# Patient Record
Sex: FEMALE | Race: WHITE | NOT HISPANIC OR LATINO | URBAN - NONMETROPOLITAN AREA
[De-identification: names, ages, dates, MRNs, and addresses within clinical notes are randomized per-mention and may not be internally consistent; named-entity substitution may affect disease eponyms.]

---

## 2017-01-18 ENCOUNTER — OFFICE VISIT (OUTPATIENT)
Dept: NEUROLOGY | Facility: CLINIC | Age: 62
End: 2017-01-18

## 2017-01-18 VITALS
BODY MASS INDEX: 31.89 KG/M2 | HEART RATE: 69 BPM | DIASTOLIC BLOOD PRESSURE: 82 MMHG | HEIGHT: 63 IN | WEIGHT: 180 LBS | OXYGEN SATURATION: 98 % | SYSTOLIC BLOOD PRESSURE: 154 MMHG

## 2017-01-18 DIAGNOSIS — R41.0 CONFUSION AND DISORIENTATION: ICD-10-CM

## 2017-01-18 DIAGNOSIS — E72.20 HYPERAMMONEMIA (HCC): Primary | ICD-10-CM

## 2017-01-18 DIAGNOSIS — R18.8 CIRRHOSIS OF LIVER WITH ASCITES, UNSPECIFIED HEPATIC CIRRHOSIS TYPE (HCC): ICD-10-CM

## 2017-01-18 DIAGNOSIS — K74.60 CIRRHOSIS OF LIVER WITH ASCITES, UNSPECIFIED HEPATIC CIRRHOSIS TYPE (HCC): ICD-10-CM

## 2017-01-18 DIAGNOSIS — R41.3 MEMORY LOSS: ICD-10-CM

## 2017-01-18 PROCEDURE — 99243 OFF/OP CNSLTJ NEW/EST LOW 30: CPT | Performed by: PSYCHIATRY & NEUROLOGY

## 2017-01-18 RX ORDER — GLIMEPIRIDE 2 MG/1
2 TABLET ORAL
COMMUNITY

## 2017-01-18 RX ORDER — BUSPIRONE HYDROCHLORIDE 5 MG/1
5 TABLET ORAL 3 TIMES DAILY
COMMUNITY

## 2017-01-18 RX ORDER — ASPIRIN 81 MG/1
81 TABLET ORAL DAILY
COMMUNITY

## 2017-01-18 RX ORDER — LISINOPRIL 10 MG/1
10 TABLET ORAL DAILY
COMMUNITY

## 2017-01-18 RX ORDER — LACTULOSE 10 G/15ML
20 SOLUTION ORAL 2 TIMES DAILY PRN
COMMUNITY

## 2017-01-18 RX ORDER — MELATONIN
1000 DAILY
COMMUNITY

## 2017-01-18 RX ORDER — NADOLOL 20 MG/1
20 TABLET ORAL DAILY
COMMUNITY

## 2017-01-18 RX ORDER — CHLORAL HYDRATE 500 MG
CAPSULE ORAL
COMMUNITY

## 2017-01-18 NOTE — PROGRESS NOTES
Subjective      Paty Harper is a 61 y.o. female.     History of Present Illness   Pt is a 62 yo F c/o trouble with memory. She has a history significant for cirrhosis secondary to NAFLD with hyperammonemia. Pt states her ammonia level last week was in the 100's. She is taking lactulose and cirrhosis is managed at . Her memory issues have been occurring for the past few years, and has been fairly stable during this time. She tends to forget how to work things, such as the answering machine. She has trouble remembering telephone numbers or keeping a cooking recipe in her memory. She reports her mind feels sluggish and slow at times. Pt denies any tremor or gait instability. Also denies any history of alcohol use.    The following portions of the patient's history were reviewed and updated as appropriate: allergies, current medications, past family history, past medical history, past social history, past surgical history and problem list.    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    Review of Systems   Constitutional: Negative for chills and fever.   HENT: Negative for congestion, hearing loss and rhinorrhea.    Eyes: Negative for pain, discharge and redness.   Respiratory: Negative for cough and shortness of breath.    Cardiovascular: Negative for chest pain and palpitations.   Gastrointestinal: Negative for abdominal pain and nausea.   Endocrine: Negative for cold intolerance and heat intolerance.   Genitourinary: Negative for dysuria, flank pain and frequency.   Musculoskeletal: Negative for joint swelling, myalgias, neck pain and neck stiffness.   Skin: Negative for pallor  "and rash.   Allergic/Immunologic: Negative for environmental allergies.   Neurological: Negative for dizziness, tremors, seizures, syncope, facial asymmetry, speech difficulty, weakness, light-headedness, numbness and headaches.   Hematological: Negative for adenopathy.   Psychiatric/Behavioral: Positive for decreased concentration. Negative for agitation, behavioral problems, confusion and hallucinations. The patient is nervous/anxious.        Objective    Visit Vitals   • /82 (BP Location: Left arm, Patient Position: Sitting)   • Pulse 69   • Ht 63\" (160 cm)   • Wt 180 lb (81.6 kg)   • SpO2 98%   • BMI 31.89 kg/m2       Physical Exam   Constitutional: She is oriented to person, place, and time. She appears well-developed and well-nourished.   HENT:   Head: Normocephalic and atraumatic.   Eyes: Conjunctivae and EOM are normal. Pupils are equal, round, and reactive to light.   Neck: Neck supple.   Cardiovascular: Normal rate and regular rhythm.    Murmur heard.  2/5 systolic murmur aortic position   Pulmonary/Chest: Effort normal and breath sounds normal. No respiratory distress. She has no wheezes. She has no rales.   Abdominal: Soft. Bowel sounds are normal. She exhibits no distension. There is no tenderness.   Musculoskeletal: Normal range of motion.   Neurological: She is alert and oriented to person, place, and time. She has normal strength. No cranial nerve deficit or sensory deficit. Gait normal.   Folstein Mental Status Exam 27/30, missing points on delayed recall, copying design, and speech repetition.   Skin: Skin is warm and dry. No erythema.   Psychiatric:   Flat affect         Assessment/Plan   Paty was seen today for memory loss.    Diagnoses and all orders for this visit:    Hyperammonemia    Cirrhosis of liver with ascites, unspecified hepatic cirrhosis type    Memory loss    Confusion and disorientation         Hyperammonemia:  Mental status and memory issues are expected sequelae of " hyperammonemia. With blood ammonia levels in the 100's, her mentation is appropriate today and her Folstein Minimental status examination score is 27/30. Will d/c medicines which are eliminated via liver, or those have an unfavorable risk/benefit. She was placed on buspirone 1 week ago for anxiety, which I plan to d/c at this time, as it is likely to exacerbate her already poor liver function.     Cirrhosis & NAFLD:  Will continue care with [facility].   If ammonia levels do not continue to drop, pt may benefit from Rifaximin or other ammonia reducing agents.      Addison Monteiro MD, FAAN  01/18/2017

## 2017-01-18 NOTE — LETTER
January 18, 2017     NIKOLAY Steward  [address] [facility] [place] [address]    Patient: Paty Harper   YOB: 1955   Date of Visit: 1/18/2017     Dear NIKOLAY Echeverria:    Thank you for referring Paty Harper to me for evaluation. Below are the relevant portions of my assessment and plan of care.    If you have questions, please do not hesitate to call me. I look forward to following Paty along with you.         Sincerely,        Addison Monteiro MD, FAAN        CC: No Known Provider    Progress Notes:  Subjective   Paty Harper is a 61 y.o. female.     History of Present Illness   Pt is a 60 yo F c/o trouble with memory. She has a history significant for cirrhosis secondary to NAFLD with hyperammonemia. Pt states her ammonia level last week was in the 100's. She is taking lactulose and cirrhosis is managed at . Her memory issues have been occurring for the past few years, and has been fairly stable during this time. She tends to forget how to work things, such as the answering machine. She has trouble remembering telephone numbers or keeping a cooking recipe in her memory. She reports her mind feels sluggish and slow at times. Pt denies any tremor or gait instability. Also denies any history of alcohol use.    {Common H&P Review Areas:10002}    Review of Systems   Constitutional: Negative for chills and fever.   HENT: Negative for congestion, hearing loss and rhinorrhea.    Eyes: Negative for pain, discharge and redness.   Respiratory: Negative for cough and shortness of breath.    Cardiovascular: Negative for chest pain and palpitations.   Gastrointestinal: Negative for abdominal pain and nausea.   Endocrine: Negative for cold intolerance and heat intolerance.   Genitourinary: Negative for dysuria, flank pain and frequency.   Musculoskeletal: Negative for joint swelling, myalgias, neck pain and neck stiffness.   Skin: Negative for pallor and rash.   Allergic/Immunologic: Negative for  "environmental allergies.   Neurological: Negative for dizziness, tremors, seizures, syncope, facial asymmetry, speech difficulty, weakness, light-headedness, numbness and headaches.   Hematological: Negative for adenopathy.   Psychiatric/Behavioral: Positive for decreased concentration. Negative for agitation, behavioral problems, confusion and hallucinations. The patient is nervous/anxious.        Objective    Visit Vitals   • /82 (BP Location: Left arm, Patient Position: Sitting)   • Pulse 69   • Ht 63\" (160 cm)   • Wt 180 lb (81.6 kg)   • SpO2 98%   • BMI 31.89 kg/m2       Physical Exam   Constitutional: She is oriented to person, place, and time. She appears well-developed and well-nourished.   HENT:   Head: Normocephalic and atraumatic.   Eyes: Conjunctivae and EOM are normal. Pupils are equal, round, and reactive to light.   Neck: Neck supple.   Cardiovascular: Normal rate and regular rhythm.    Murmur heard.  2/5 systolic murmur aortic position   Pulmonary/Chest: Effort normal and breath sounds normal. No respiratory distress. She has no wheezes. She has no rales.   Abdominal: Soft. Bowel sounds are normal. She exhibits no distension. There is no tenderness.   Musculoskeletal: Normal range of motion.   Neurological: She is alert and oriented to person, place, and time. She has normal strength. No cranial nerve deficit or sensory deficit. Gait normal.   Folstein Mental Status Exam 27/30, missing points on delayed recall, copying design, and speech repetition.   Skin: Skin is warm and dry. No erythema.   Psychiatric:   Flat affect         Assessment/Plan   Paty was seen today for memory loss.    Diagnoses and all orders for this visit:    Hyperammonemia    Cirrhosis of liver with ascites, unspecified hepatic cirrhosis type    Memory loss    Confusion and disorientation         Hyperammonemia:  Mental status and memory issues are expected sequelae of hyperammonemia. With blood ammonia levels in the " 100's, her mentation is appropriate today and her Folstein Minimental status examination score is 27/30. Will d/c medicines which are eliminated via liver, or those have an unfavorable risk/benefit. She was placed on buspirone 1 week ago for anxiety, which I plan to d/c at this time, as it is likely to exacerbate her already poor liver function.     Cirrhosis & NAFLD:  Will continue care with  hepatology.   If ammonia levels do not continue to drop, pt may benefit from Rifaximin or other ammonia reducing agents.      Addison Monteiro MD, FAAN  01/18/2017

## 2017-01-18 NOTE — LETTER
January 18, 2017     NIKOLAY Steward  [address] [facility] [place] [address]    Patient: Paty Harper   YOB: 1955   Date of Visit: 1/18/2017     Dear NIKOLAY Echeverria:    Thank you for referring Paty Harper to me for evaluation. Below are the relevant portions of my assessment and plan of care.    If you have questions, please do not hesitate to call me. I look forward to following Paty along with you.         Sincerely,        Addison Monteiro MD, FAAN        CC: No Known Provider    Progress Notes:  Subjective   Paty Harper is a 61 y.o. female.     History of Present Illness   Pt is a 60 yo F c/o trouble with memory. She has a history significant for cirrhosis secondary to NAFLD with hyperammonemia. Pt states her ammonia level last week was in the 100's. She is taking lactulose and cirrhosis is managed at . Her memory issues have been occurring for the past few years, and has been fairly stable during this time. She tends to forget how to work things, such as the answering machine. She has trouble remembering telephone numbers or keeping a cooking recipe in her memory. She reports her mind feels sluggish and slow at times. Pt denies any tremor or gait instability. Also denies any history of alcohol use.    The following portions of the patient's history were reviewed and updated as appropriate: allergies, current medications, past family history, past medical history, past social history, past surgical history and problem list.    Review of Systems   Constitutional: Negative for chills and fever.   HENT: Negative for congestion, hearing loss and rhinorrhea.    Eyes: Negative for pain, discharge and redness.   Respiratory: Negative for cough and shortness of breath.    Cardiovascular: Negative for chest pain and palpitations.   Gastrointestinal: Negative for abdominal pain and nausea.   Endocrine: Negative for cold intolerance and heat intolerance.   Genitourinary: Negative for dysuria, flank  "pain and frequency.   Musculoskeletal: Negative for joint swelling, myalgias, neck pain and neck stiffness.   Skin: Negative for pallor and rash.   Allergic/Immunologic: Negative for environmental allergies.   Neurological: Negative for dizziness, tremors, seizures, syncope, facial asymmetry, speech difficulty, weakness, light-headedness, numbness and headaches.   Hematological: Negative for adenopathy.   Psychiatric/Behavioral: Positive for decreased concentration. Negative for agitation, behavioral problems, confusion and hallucinations. The patient is nervous/anxious.        Objective    Visit Vitals   • /82 (BP Location: Left arm, Patient Position: Sitting)   • Pulse 69   • Ht 63\" (160 cm)   • Wt 180 lb (81.6 kg)   • SpO2 98%   • BMI 31.89 kg/m2       Physical Exam   Constitutional: She is oriented to person, place, and time. She appears well-developed and well-nourished.   HENT:   Head: Normocephalic and atraumatic.   Eyes: Conjunctivae and EOM are normal. Pupils are equal, round, and reactive to light.   Neck: Neck supple.   Cardiovascular: Normal rate and regular rhythm.    Murmur heard.  2/5 systolic murmur aortic position   Pulmonary/Chest: Effort normal and breath sounds normal. No respiratory distress. She has no wheezes. She has no rales.   Abdominal: Soft. Bowel sounds are normal. She exhibits no distension. There is no tenderness.   Musculoskeletal: Normal range of motion.   Neurological: She is alert and oriented to person, place, and time. She has normal strength. No cranial nerve deficit or sensory deficit. Gait normal.   Folstein Mental Status Exam 27/30, missing points on delayed recall, copying design, and speech repetition.   Skin: Skin is warm and dry. No erythema.   Psychiatric:   Flat affect         Assessment/Plan   Paty was seen today for memory loss.    Diagnoses and all orders for this visit:    Hyperammonemia    Cirrhosis of liver with ascites, unspecified hepatic cirrhosis " type    Memory loss    Confusion and disorientation         Hyperammonemia:  Mental status and memory issues are expected sequelae of hyperammonemia. With blood ammonia levels in the 100's, her mentation is appropriate today and her Folstein Minimental status examination score is 27/30. Will d/c medicines which are eliminated via liver, or those have an unfavorable risk/benefit. She was placed on buspirone 1 week ago for anxiety, which I plan to d/c at this time, as it is likely to exacerbate her already poor liver function.     Cirrhosis & NAFLD:  Will continue care with [facility].   If ammonia levels do not continue to drop, pt may benefit from Rifaximin or other ammonia reducing agents.      Addison Monteiro MD, FAAN  01/18/2017

## 2017-01-18 NOTE — MR AVS SNAPSHOT
"                        Paty Harper   1/18/2017 11:00 AM   Office Visit    Dept Phone:  [phone]   Encounter #:  [id number]    Provider:  Addison Monteiro MD, FAAN   Department:  [facility]                Your Full Care Plan              Your Updated Medication List          This list is accurate as of: 1/18/17 12:19 PM.  Always use your most recent med list.                aspirin 81 MG EC tablet       busPIRone 5 MG tablet   Commonly known as:  BUSPAR       cholecalciferol 1000 UNITS tablet   Commonly known as:  VITAMIN D3       fish oil 1000 MG capsule capsule       glimepiride 2 MG tablet   Commonly known as:  AMARYL       lactulose 10 GM/15ML solution   Commonly known as:  CHRONULAC       lisinopril 10 MG tablet   Commonly known as:  PRINIVIL,ZESTRIL       metFORMIN 1000 MG tablet   Commonly known as:  GLUCOPHAGE       nadolol 20 MG tablet   Commonly known as:  CORGARD               Instructions     None    Patient Instructions History      Upcoming Appointments     Visit Type Date Time Department    NEW PATIENT 1/18/2017 11:00 AM [facility]      MyChart Signup     Our records indicate that you have [facility]. If you would like to sign up for [facility], please email [email or web address] or call [phone] to obtain an activation code.             Other Info from Your Visit           Allergies     Prozac [Fluoxetine Hcl]      Azithromycin  Rash      Reason for Visit     Memory Loss Short term memory loss      Vital Signs     Blood Pressure Pulse Height Weight Oxygen Saturation Body Mass Index    154/82 (BP Location: Left arm, Patient Position: Sitting) 69 63\" (160 cm) 180 lb (81.6 kg) 98% 31.89 kg/m2    Smoking Status                   Never Assessed             "

## 2019-04-10 RX ORDER — SODIUM, POTASSIUM,MAG SULFATES 17.5-3.13G
SOLUTION, RECONSTITUTED, ORAL ORAL
Qty: 2 BOTTLE | Refills: 0 | Status: SHIPPED | OUTPATIENT
Start: 2019-04-10 | End: 2019-12-04 | Stop reason: SDUPTHER

## 2019-10-22 ENCOUNTER — OFFICE VISIT (OUTPATIENT)
Dept: OBSTETRICS AND GYNECOLOGY | Facility: CLINIC | Age: 64
End: 2019-10-22

## 2019-10-22 VITALS
DIASTOLIC BLOOD PRESSURE: 78 MMHG | BODY MASS INDEX: 36.32 KG/M2 | WEIGHT: 205 LBS | SYSTOLIC BLOOD PRESSURE: 128 MMHG | HEIGHT: 63 IN

## 2019-10-22 DIAGNOSIS — N95.2 VAGINAL ATROPHY: ICD-10-CM

## 2019-10-22 DIAGNOSIS — N81.4 UTEROVAGINAL PROLAPSE: Primary | ICD-10-CM

## 2019-10-22 PROCEDURE — 99203 OFFICE O/P NEW LOW 30 MIN: CPT | Performed by: OBSTETRICS & GYNECOLOGY

## 2019-10-22 NOTE — PROGRESS NOTES
Subjective   Chief Complaint   Patient presents with   • Vaginal Prolapse     Patient is here with c/o prolapse, referred from family doctor.     Paty Harper is a 63 y.o. year old  presenting to be seen for pelvic organ prolapse.    During a recent exam with her family physician, a prolapse was noted.  She has no pressure symptoms.  She has no urinary or deprecatory symptoms.  She is not sexually active.  She has not noticed any bulges in the vagina.    OB Hx: 2 term vaginal deliveries  Pap smear: All normal  Mammogram: 2019 - normal  Colonoscopy: 2016 - normal  DEXA Scan: 2019 - normal    She denies nausea, emesis, fevers, chills, significant weight changes, hair/skin/nail changes, dysuria, urinary frequency, palpitations, chest pain, headaches, myalgia, dyspnea.     History  Past Medical History:   Diagnosis Date   • Anemia    • Anxiety    • Cirrhosis of liver (CMS/HCC)    • Depressed    • Diabetes mellitus (CMS/HCC)    • Hypertension    • Pelvic prolapse    • Urinary tract infection    , Past Surgical History:   Procedure Laterality Date   • CHOLECYSTECTOMY     • TUBAL ABDOMINAL LIGATION     , Family History   Problem Relation Age of Onset   • Dementia Mother    • Heart disease Mother    • Hypertension Mother    • Hypertension Father    • Cancer Sister    • Alcohol abuse Brother    • Chorea Maternal Grandmother    • Stroke Maternal Grandmother    , Social History     Tobacco Use   • Smoking status: Never Smoker   • Smokeless tobacco: Never Used   Substance Use Topics   • Alcohol use: No     Frequency: Never   • Drug use: No   ,   (Not in a hospital admission) and Allergies:  Prozac [fluoxetine hcl] and Azithromycin    Current Outpatient Medications on File Prior to Visit   Medication Sig Dispense Refill   • aspirin 81 MG EC tablet Take 81 mg by mouth Daily.     • busPIRone (BUSPAR) 5 MG tablet Take 5 mg by mouth 3 (Three) Times a Day.     • cholecalciferol (VITAMIN D3) 1000 UNITS tablet Take 1,000 Units by  "mouth Daily.     • glimepiride (AMARYL) 2 MG tablet Take 2 mg by mouth Every Morning Before Breakfast. 1 1/2 pill     • lactulose (CHRONULAC) 10 GM/15ML solution Take 20 g by mouth 2 (Two) Times a Day As Needed.     • lisinopril (PRINIVIL,ZESTRIL) 10 MG tablet Take 10 mg by mouth Daily.     • metFORMIN (GLUCOPHAGE) 1000 MG tablet Take 1,000 mg by mouth.     • nadolol (CORGARD) 20 MG tablet Take 20 mg by mouth Daily.     • Omega-3 Fatty Acids (FISH OIL) 1000 MG capsule capsule Take  by mouth Daily With Breakfast. 4 qd     • sodium-potassium-magnesium sulfates (SUPREP BOWEL PREP KIT) 17.5-3.13-1.6 GM/177ML solution oral solution Please follow the directions mailed to you by our office. If you have any questions call our office at [phone] 2 bottle 0     No current facility-administered medications on file prior to visit.        Social History    Tobacco Use      Smoking status: Never Smoker      Smokeless tobacco: Never Used      Review of Systems  Pertinent items are noted in HPI, all other systems were reviewed and negative       Objective   /78   Ht 160 cm (63\")   Wt 93 kg (205 lb)   BMI 36.31 kg/m²     Physical Exam:  General Appearance: alert, pleasant, appears stated age, interactive and cooperative  Head: normocephalic, without obvious abnormality and atraumatic  Eyes: lids and lashes normal and no icterus  Ears: ears appear intact with no abnormalities noted  Nose: nares normal, septum midline, mucosa normal and no drainage  Neck: suppple, trachea midline and no thyromegaly  Back: no kyphosis present, no scoliosis present and range of motion normal  Lungs: respirations regular, respirations even and respirations unlabored, clear to auscultation bilaterally   Heart: regular rhythm and normal rate, normal S1, S2, no murmur, gallop, or rubs and no click  Breasts: Not performed.  Abdomen: no masses, no hepatomegaly, no splenomegaly, soft non-tender, no guarding and no rebound tenderness  Extremities: " moves extremities well, no edema, no cyanosis and no redness  Skin: no bleeding, bruising or rash and no lesions noted  Lymph Nodes: no palpable adenopathy  Neurologic: Cranial Nerves cranial nerves 2 - 12 grossly intact, Speech normal content and execusion, Coordination normal  Psych: normal mood and affect, oriented to person, time and place, thought content organized and appropriate judgment    Pelvis:  Pelvic: Clinical staff was present for exam  External genitalia:  normal appearance of the external genitalia including Bartholin's and Whitaker's glands.  :  urethral meatus normal;  Vagina:  atrophic mucosal changes are present;  Cervix:  normal appearance.  Uterus:  normal size, shape and consistency.  Adnexa:  normal bimanual exam of the adnexa.  Rectal:  digital rectal exam not performed; anus visually normal appearing.  Cystocele GRADE 2  Rectocele GRADE 2  Uterine GRADE 1  Enterocele not appreciated  Pelvic floor pain: pelvic floor is nontender to palpation in 4 quadrants.    Review of Labs:   No data reviewed    Review of Imaging:  No data reviewed    Decision to obtain old records:  No.    Summarization of old records:  N/A    Independent review of image, tracing or specimen:  A pelvic ultrasound was ordered and independently reviewed today.        Assessment   Grade 2 uterovaginal prolapse  Vaginal atrophy     Plan    No orders of the defined types were placed in this encounter.    Medications ordered: None    Procedures performed: None    We reviewed her exam findings in detail today. We reviewed prolapse animations on the computer as well as discussed treatment with both pessary and surgery. Given that she has no symptoms at this time, I recommend continued expectant management.  We discussed possible use of vaginal estrogen if she becomes sexually active.  All questions answered.    Wally Peterson MD  Obstetrics and Gynecology  [facility]

## 2019-10-30 PROBLEM — N81.4 UTEROVAGINAL PROLAPSE: Status: ACTIVE | Noted: 2019-10-30

## 2019-12-04 RX ORDER — SODIUM, POTASSIUM,MAG SULFATES 17.5-3.13G
SOLUTION, RECONSTITUTED, ORAL ORAL
Qty: 2 BOTTLE | Refills: 0 | Status: SHIPPED | OUTPATIENT
Start: 2019-12-04 | End: 2020-05-22

## 2019-12-10 ENCOUNTER — LAB REQUISITION (OUTPATIENT)
Dept: LAB | Facility: HOSPITAL | Age: 64
End: 2019-12-10

## 2019-12-10 ENCOUNTER — OUTSIDE FACILITY SERVICE (OUTPATIENT)
Dept: GASTROENTEROLOGY | Facility: CLINIC | Age: 64
End: 2019-12-10

## 2019-12-10 DIAGNOSIS — Z86.010 PERSONAL HISTORY OF COLONIC POLYPS: ICD-10-CM

## 2019-12-10 LAB
BASOPHILS # BLD AUTO: 0.02 10*3/MM3 (ref 0–0.2)
BASOPHILS NFR BLD AUTO: 0.3 % (ref 0–1.5)
DEPRECATED RDW RBC AUTO: 54.2 FL (ref 37–54)
EOSINOPHIL # BLD AUTO: 0.01 10*3/MM3 (ref 0–0.4)
EOSINOPHIL NFR BLD AUTO: 0.1 % (ref 0.3–6.2)
ERYTHROCYTE [DISTWIDTH] IN BLOOD BY AUTOMATED COUNT: 14.7 % (ref 12.3–15.4)
HCT VFR BLD AUTO: 23.9 % (ref 34–46.6)
HGB BLD-MCNC: 7.5 G/DL (ref 12–15.9)
IMM GRANULOCYTES # BLD AUTO: 0.13 10*3/MM3 (ref 0–0.05)
IMM GRANULOCYTES NFR BLD AUTO: 1.9 % (ref 0–0.5)
INR PPP: 1.93 (ref 0.85–1.16)
LYMPHOCYTES # BLD AUTO: 0.5 10*3/MM3 (ref 0.7–3.1)
LYMPHOCYTES NFR BLD AUTO: 7.2 % (ref 19.6–45.3)
MCH RBC QN AUTO: 32.1 PG (ref 26.6–33)
MCHC RBC AUTO-ENTMCNC: 31.4 G/DL (ref 31.5–35.7)
MCV RBC AUTO: 102.1 FL (ref 79–97)
MONOCYTES # BLD AUTO: 0.23 10*3/MM3 (ref 0.1–0.9)
MONOCYTES NFR BLD AUTO: 3.3 % (ref 5–12)
NEUTROPHILS # BLD AUTO: 6.07 10*3/MM3 (ref 1.7–7)
NEUTROPHILS NFR BLD AUTO: 87.2 % (ref 42.7–76)
NRBC BLD AUTO-RTO: 0 /100 WBC (ref 0–0.2)
PLATELET # BLD AUTO: 94 10*3/MM3 (ref 140–450)
PMV BLD AUTO: 9.8 FL (ref 6–12)
PROTHROMBIN TIME: 21.2 SECONDS (ref 11.2–14.3)
RBC # BLD AUTO: 2.34 10*6/MM3 (ref 3.77–5.28)
WBC NRBC COR # BLD: 6.96 10*3/MM3 (ref 3.4–10.8)

## 2019-12-10 PROCEDURE — 45385 COLONOSCOPY W/LESION REMOVAL: CPT | Performed by: INTERNAL MEDICINE

## 2019-12-10 PROCEDURE — 85025 COMPLETE CBC W/AUTO DIFF WBC: CPT | Performed by: INTERNAL MEDICINE

## 2019-12-10 PROCEDURE — 85610 PROTHROMBIN TIME: CPT | Performed by: INTERNAL MEDICINE

## 2019-12-10 PROCEDURE — 88305 TISSUE EXAM BY PATHOLOGIST: CPT | Performed by: INTERNAL MEDICINE

## 2019-12-11 LAB
CYTO UR: NORMAL
LAB AP CASE REPORT: NORMAL
LAB AP CLINICAL INFORMATION: NORMAL
PATH REPORT.FINAL DX SPEC: NORMAL
PATH REPORT.GROSS SPEC: NORMAL

## 2020-05-22 ENCOUNTER — OFFICE VISIT (OUTPATIENT)
Dept: OBSTETRICS AND GYNECOLOGY | Facility: CLINIC | Age: 65
End: 2020-05-22

## 2020-05-22 VITALS
WEIGHT: 156 LBS | BODY MASS INDEX: 27.64 KG/M2 | SYSTOLIC BLOOD PRESSURE: 130 MMHG | DIASTOLIC BLOOD PRESSURE: 62 MMHG | HEIGHT: 63 IN

## 2020-05-22 DIAGNOSIS — N95.0 PMB (POSTMENOPAUSAL BLEEDING): Primary | ICD-10-CM

## 2020-05-22 PROCEDURE — 99213 OFFICE O/P EST LOW 20 MIN: CPT | Performed by: OBSTETRICS & GYNECOLOGY

## 2020-05-22 RX ORDER — BUPROPION HYDROCHLORIDE 150 MG/1
TABLET, EXTENDED RELEASE ORAL
COMMUNITY
Start: 2020-05-13

## 2020-05-22 RX ORDER — RIFAXIMIN 550 MG/1
TABLET ORAL
COMMUNITY
Start: 2020-05-20

## 2020-05-22 RX ORDER — OMEPRAZOLE 20 MG/1
CAPSULE, DELAYED RELEASE ORAL
COMMUNITY
Start: 2020-02-21

## 2020-05-22 RX ORDER — SPIRONOLACTONE 100 MG/1
TABLET, FILM COATED ORAL
COMMUNITY
Start: 2020-05-04

## 2020-05-22 RX ORDER — BLOOD-GLUCOSE METER
EACH MISCELLANEOUS
COMMUNITY
Start: 2020-05-11

## 2020-05-22 RX ORDER — FUROSEMIDE 40 MG/1
TABLET ORAL
COMMUNITY
Start: 2020-05-04

## 2020-05-22 RX ORDER — LANCETS 33 GAUGE
EACH MISCELLANEOUS
COMMUNITY
Start: 2020-05-11

## 2020-05-22 RX ORDER — QUETIAPINE FUMARATE 50 MG/1
TABLET, FILM COATED ORAL
COMMUNITY
Start: 2020-04-21

## 2020-05-22 RX ORDER — BLOOD SUGAR DIAGNOSTIC
STRIP MISCELLANEOUS
COMMUNITY
Start: 2020-05-11

## 2020-05-23 LAB
APPEARANCE UR: CLEAR
BACTERIA #/AREA URNS HPF: NORMAL /HPF
BILIRUB UR QL STRIP: NEGATIVE
COLOR UR: YELLOW
EPI CELLS #/AREA URNS HPF: NORMAL /HPF (ref 0–10)
GLUCOSE UR QL: NEGATIVE
HGB UR QL STRIP: NEGATIVE
KETONES UR QL STRIP: NEGATIVE
LEUKOCYTE ESTERASE UR QL STRIP: NEGATIVE
MICRO URNS: NORMAL
MICRO URNS: NORMAL
NITRITE UR QL STRIP: NEGATIVE
PH UR STRIP: 7.5 [PH] (ref 5–7.5)
PROT UR QL STRIP: NEGATIVE
RBC #/AREA URNS HPF: NORMAL /HPF (ref 0–2)
SP GR UR: 1.01 (ref 1–1.03)
URINALYSIS REFLEX: NORMAL
UROBILINOGEN UR STRIP-MCNC: 0.2 MG/DL (ref 0.2–1)
WBC #/AREA URNS HPF: NORMAL /HPF (ref 0–5)

## 2020-05-23 NOTE — PROGRESS NOTES
Chief Complaint   Patient presents with   • Vaginal Bleeding     Light spotting when wiping.       Paty Harper is a 64 y.o. year old  presenting to be seen for vaginal spotting.    She reports a pink vaginal discharge for roughly 1 month.  She will sometimes notice pink spots in her underwear.  No overt bleeding.  No pain symptoms.    Exam:  Pink tinged mucus in the vaginal vault.  Vaginal atrophy present.  Normal-appearing cervix.    Assessment/Plan:  Vaginal spotting    Expectant management for now.  Return in 1 month.  If bleeding persists, we will proceed with a pelvic ultrasound to look at the endometrial lining.  A catheter urine specimen was obtained today to rule out a bladder source.    Greater than 50% of this 15 minute visit was spent in face-to-face counseling and/or coordination of care for this patient.    Wally Peterson MD  Obstetrics and Gynecology  [facility]